# Patient Record
Sex: FEMALE | Race: WHITE | HISPANIC OR LATINO | Employment: OTHER | URBAN - METROPOLITAN AREA
[De-identification: names, ages, dates, MRNs, and addresses within clinical notes are randomized per-mention and may not be internally consistent; named-entity substitution may affect disease eponyms.]

---

## 2017-12-25 ENCOUNTER — RESOLUTE PROFESSIONAL BILLING HOSPITAL PROF FEE (OUTPATIENT)
Dept: HOSPITALIST | Facility: MEDICAL CENTER | Age: 67
End: 2017-12-25
Payer: MEDICARE

## 2017-12-25 ENCOUNTER — APPOINTMENT (OUTPATIENT)
Dept: RADIOLOGY | Facility: MEDICAL CENTER | Age: 67
DRG: 193 | End: 2017-12-25
Attending: EMERGENCY MEDICINE
Payer: MEDICARE

## 2017-12-25 ENCOUNTER — HOSPITAL ENCOUNTER (INPATIENT)
Facility: MEDICAL CENTER | Age: 67
LOS: 2 days | DRG: 193 | End: 2017-12-27
Attending: EMERGENCY MEDICINE | Admitting: INTERNAL MEDICINE
Payer: MEDICARE

## 2017-12-25 ENCOUNTER — APPOINTMENT (OUTPATIENT)
Dept: RADIOLOGY | Facility: IMAGING CENTER | Age: 67
End: 2017-12-25
Attending: FAMILY MEDICINE
Payer: MEDICARE

## 2017-12-25 ENCOUNTER — OFFICE VISIT (OUTPATIENT)
Dept: URGENT CARE | Facility: PHYSICIAN GROUP | Age: 67
End: 2017-12-25
Payer: MEDICARE

## 2017-12-25 VITALS
DIASTOLIC BLOOD PRESSURE: 58 MMHG | SYSTOLIC BLOOD PRESSURE: 94 MMHG | OXYGEN SATURATION: 94 % | HEART RATE: 87 BPM | TEMPERATURE: 101.6 F

## 2017-12-25 DIAGNOSIS — R07.9 CHEST PAIN, UNSPECIFIED TYPE: ICD-10-CM

## 2017-12-25 DIAGNOSIS — R09.02 HYPOXIA: ICD-10-CM

## 2017-12-25 DIAGNOSIS — J44.1 ACUTE EXACERBATION OF CHRONIC OBSTRUCTIVE PULMONARY DISEASE (COPD) (HCC): ICD-10-CM

## 2017-12-25 DIAGNOSIS — J10.1 INFLUENZA A: ICD-10-CM

## 2017-12-25 DIAGNOSIS — R06.02 SHORTNESS OF BREATH: ICD-10-CM

## 2017-12-25 DIAGNOSIS — J44.9 CHRONIC OBSTRUCTIVE PULMONARY DISEASE, UNSPECIFIED COPD TYPE (HCC): ICD-10-CM

## 2017-12-25 DIAGNOSIS — Z72.0 TOBACCO USE: ICD-10-CM

## 2017-12-25 PROBLEM — E86.0 DEHYDRATION: Status: ACTIVE | Noted: 2017-12-25

## 2017-12-25 PROBLEM — E03.9 HYPOTHYROIDISM: Status: ACTIVE | Noted: 2017-12-25

## 2017-12-25 PROBLEM — E87.6 HYPOKALEMIA: Status: ACTIVE | Noted: 2017-12-25

## 2017-12-25 PROBLEM — I10 ESSENTIAL HYPERTENSION: Status: ACTIVE | Noted: 2017-12-25

## 2017-12-25 PROBLEM — J11.1 INFLUENZA: Status: ACTIVE | Noted: 2017-12-25

## 2017-12-25 LAB
ALBUMIN SERPL BCP-MCNC: 3.9 G/DL (ref 3.2–4.9)
ALBUMIN/GLOB SERPL: 1.1 G/DL
ALP SERPL-CCNC: 58 U/L (ref 30–99)
ALT SERPL-CCNC: 21 U/L (ref 2–50)
ANION GAP SERPL CALC-SCNC: 10 MMOL/L (ref 0–11.9)
APPEARANCE UR: CLEAR
AST SERPL-CCNC: 27 U/L (ref 12–45)
BACTERIA #/AREA URNS HPF: ABNORMAL /HPF
BASOPHILS # BLD AUTO: 0.5 % (ref 0–1.8)
BASOPHILS # BLD: 0.03 K/UL (ref 0–0.12)
BILIRUB SERPL-MCNC: 0.8 MG/DL (ref 0.1–1.5)
BILIRUB UR QL STRIP.AUTO: NEGATIVE
BNP SERPL-MCNC: 46 PG/ML (ref 0–100)
BUN SERPL-MCNC: 25 MG/DL (ref 8–22)
CALCIUM SERPL-MCNC: 9 MG/DL (ref 8.5–10.5)
CHLORIDE SERPL-SCNC: 101 MMOL/L (ref 96–112)
CO2 SERPL-SCNC: 23 MMOL/L (ref 20–33)
COLOR UR: YELLOW
CREAT SERPL-MCNC: 1.22 MG/DL (ref 0.5–1.4)
EKG IMPRESSION: NORMAL
EOSINOPHIL # BLD AUTO: 0.02 K/UL (ref 0–0.51)
EOSINOPHIL NFR BLD: 0.3 % (ref 0–6.9)
EPI CELLS #/AREA URNS HPF: ABNORMAL /HPF
ERYTHROCYTE [DISTWIDTH] IN BLOOD BY AUTOMATED COUNT: 46.3 FL (ref 35.9–50)
FLUAV RNA SPEC QL NAA+PROBE: POSITIVE
FLUBV RNA SPEC QL NAA+PROBE: NEGATIVE
GFR SERPL CREATININE-BSD FRML MDRD: 44 ML/MIN/1.73 M 2
GLOBULIN SER CALC-MCNC: 3.4 G/DL (ref 1.9–3.5)
GLUCOSE SERPL-MCNC: 114 MG/DL (ref 65–99)
GLUCOSE UR STRIP.AUTO-MCNC: NEGATIVE MG/DL
HCT VFR BLD AUTO: 39.6 % (ref 37–47)
HGB BLD-MCNC: 12.7 G/DL (ref 12–16)
HYALINE CASTS #/AREA URNS LPF: ABNORMAL /LPF
IMM GRANULOCYTES # BLD AUTO: 0.03 K/UL (ref 0–0.11)
IMM GRANULOCYTES NFR BLD AUTO: 0.5 % (ref 0–0.9)
KETONES UR STRIP.AUTO-MCNC: ABNORMAL MG/DL
LACTATE BLD-SCNC: 1.2 MMOL/L (ref 0.5–2)
LEUKOCYTE ESTERASE UR QL STRIP.AUTO: ABNORMAL
LYMPHOCYTES # BLD AUTO: 1.36 K/UL (ref 1–4.8)
LYMPHOCYTES NFR BLD: 20.5 % (ref 22–41)
MCH RBC QN AUTO: 28.7 PG (ref 27–33)
MCHC RBC AUTO-ENTMCNC: 32.1 G/DL (ref 33.6–35)
MCV RBC AUTO: 89.6 FL (ref 81.4–97.8)
MICRO URNS: ABNORMAL
MONOCYTES # BLD AUTO: 0.73 K/UL (ref 0–0.85)
MONOCYTES NFR BLD AUTO: 11 % (ref 0–13.4)
NEUTROPHILS # BLD AUTO: 4.45 K/UL (ref 2–7.15)
NEUTROPHILS NFR BLD: 67.2 % (ref 44–72)
NITRITE UR QL STRIP.AUTO: NEGATIVE
NRBC # BLD AUTO: 0 K/UL
NRBC BLD-RTO: 0 /100 WBC
PH UR STRIP.AUTO: 5 [PH]
PLATELET # BLD AUTO: 181 K/UL (ref 164–446)
PMV BLD AUTO: 11.6 FL (ref 9–12.9)
POTASSIUM SERPL-SCNC: 3.5 MMOL/L (ref 3.6–5.5)
PROCALCITONIN SERPL-MCNC: 0.19 NG/ML
PROT SERPL-MCNC: 7.3 G/DL (ref 6–8.2)
PROT UR QL STRIP: NEGATIVE MG/DL
RBC # BLD AUTO: 4.42 M/UL (ref 4.2–5.4)
RBC # URNS HPF: ABNORMAL /HPF
RBC UR QL AUTO: ABNORMAL
SODIUM SERPL-SCNC: 134 MMOL/L (ref 135–145)
SP GR UR STRIP.AUTO: 1.01
TROPONIN I SERPL-MCNC: 0.02 NG/ML (ref 0–0.04)
UROBILINOGEN UR STRIP.AUTO-MCNC: 0.2 MG/DL
WBC # BLD AUTO: 6.6 K/UL (ref 4.8–10.8)
WBC #/AREA URNS HPF: ABNORMAL /HPF

## 2017-12-25 PROCEDURE — 700111 HCHG RX REV CODE 636 W/ 250 OVERRIDE (IP): Performed by: EMERGENCY MEDICINE

## 2017-12-25 PROCEDURE — 84484 ASSAY OF TROPONIN QUANT: CPT

## 2017-12-25 PROCEDURE — 94760 N-INVAS EAR/PLS OXIMETRY 1: CPT

## 2017-12-25 PROCEDURE — 84145 PROCALCITONIN (PCT): CPT

## 2017-12-25 PROCEDURE — 700102 HCHG RX REV CODE 250 W/ 637 OVERRIDE(OP): Performed by: STUDENT IN AN ORGANIZED HEALTH CARE EDUCATION/TRAINING PROGRAM

## 2017-12-25 PROCEDURE — 700105 HCHG RX REV CODE 258: Performed by: EMERGENCY MEDICINE

## 2017-12-25 PROCEDURE — 99223 1ST HOSP IP/OBS HIGH 75: CPT | Mod: AI,GC | Performed by: INTERNAL MEDICINE

## 2017-12-25 PROCEDURE — 87086 URINE CULTURE/COLONY COUNT: CPT

## 2017-12-25 PROCEDURE — 99285 EMERGENCY DEPT VISIT HI MDM: CPT

## 2017-12-25 PROCEDURE — 87502 INFLUENZA DNA AMP PROBE: CPT

## 2017-12-25 PROCEDURE — 36415 COLL VENOUS BLD VENIPUNCTURE: CPT

## 2017-12-25 PROCEDURE — 80053 COMPREHEN METABOLIC PANEL: CPT

## 2017-12-25 PROCEDURE — 700111 HCHG RX REV CODE 636 W/ 250 OVERRIDE (IP): Performed by: STUDENT IN AN ORGANIZED HEALTH CARE EDUCATION/TRAINING PROGRAM

## 2017-12-25 PROCEDURE — 83605 ASSAY OF LACTIC ACID: CPT

## 2017-12-25 PROCEDURE — 87040 BLOOD CULTURE FOR BACTERIA: CPT | Mod: 91

## 2017-12-25 PROCEDURE — 93005 ELECTROCARDIOGRAM TRACING: CPT | Performed by: EMERGENCY MEDICINE

## 2017-12-25 PROCEDURE — 94640 AIRWAY INHALATION TREATMENT: CPT

## 2017-12-25 PROCEDURE — 700101 HCHG RX REV CODE 250: Performed by: EMERGENCY MEDICINE

## 2017-12-25 PROCEDURE — A9270 NON-COVERED ITEM OR SERVICE: HCPCS | Performed by: STUDENT IN AN ORGANIZED HEALTH CARE EDUCATION/TRAINING PROGRAM

## 2017-12-25 PROCEDURE — 83880 ASSAY OF NATRIURETIC PEPTIDE: CPT

## 2017-12-25 PROCEDURE — 71020 DX-CHEST-2 VIEWS: CPT | Mod: TC | Performed by: FAMILY MEDICINE

## 2017-12-25 PROCEDURE — 700105 HCHG RX REV CODE 258: Performed by: STUDENT IN AN ORGANIZED HEALTH CARE EDUCATION/TRAINING PROGRAM

## 2017-12-25 PROCEDURE — 93000 ELECTROCARDIOGRAM COMPLETE: CPT | Performed by: FAMILY MEDICINE

## 2017-12-25 PROCEDURE — 85025 COMPLETE CBC W/AUTO DIFF WBC: CPT

## 2017-12-25 PROCEDURE — 81001 URINALYSIS AUTO W/SCOPE: CPT

## 2017-12-25 PROCEDURE — 99205 OFFICE O/P NEW HI 60 MIN: CPT | Performed by: FAMILY MEDICINE

## 2017-12-25 PROCEDURE — 770021 HCHG ROOM/CARE - ISO PRIVATE

## 2017-12-25 RX ORDER — VARENICLINE TARTRATE 1 MG/1
1 TABLET, FILM COATED ORAL 2 TIMES DAILY
Status: DISCONTINUED | OUTPATIENT
Start: 2017-12-25 | End: 2017-12-25

## 2017-12-25 RX ORDER — OXYBUTYNIN CHLORIDE 5 MG/1
5 TABLET ORAL 2 TIMES DAILY
Status: DISCONTINUED | OUTPATIENT
Start: 2017-12-25 | End: 2017-12-27 | Stop reason: HOSPADM

## 2017-12-25 RX ORDER — NICOTINE POLACRILEX 4 MG/1
GUM, CHEWING ORAL
COMMUNITY
End: 2017-12-25

## 2017-12-25 RX ORDER — AMOXICILLIN 250 MG
2 CAPSULE ORAL 2 TIMES DAILY
Status: DISCONTINUED | OUTPATIENT
Start: 2017-12-26 | End: 2017-12-27 | Stop reason: HOSPADM

## 2017-12-25 RX ORDER — ASPIRIN 81 MG/1
81 TABLET, CHEWABLE ORAL EVERY EVENING
Status: DISCONTINUED | OUTPATIENT
Start: 2017-12-25 | End: 2017-12-27 | Stop reason: HOSPADM

## 2017-12-25 RX ORDER — POLYETHYLENE GLYCOL 3350 17 G/17G
1 POWDER, FOR SOLUTION ORAL
Status: DISCONTINUED | OUTPATIENT
Start: 2017-12-25 | End: 2017-12-27 | Stop reason: HOSPADM

## 2017-12-25 RX ORDER — NIACIN 500 MG/1
500 TABLET, EXTENDED RELEASE ORAL NIGHTLY
COMMUNITY
End: 2017-12-25

## 2017-12-25 RX ORDER — ACETAMINOPHEN 325 MG/1
650 TABLET ORAL EVERY 6 HOURS PRN
Status: DISCONTINUED | OUTPATIENT
Start: 2017-12-25 | End: 2017-12-27 | Stop reason: HOSPADM

## 2017-12-25 RX ORDER — VARENICLINE TARTRATE 1 MG/1
1 TABLET, FILM COATED ORAL 2 TIMES DAILY
COMMUNITY

## 2017-12-25 RX ORDER — NIACIN 500 MG
500 TABLET ORAL DAILY
COMMUNITY

## 2017-12-25 RX ORDER — LOVASTATIN 20 MG/1
20 TABLET ORAL NIGHTLY
COMMUNITY

## 2017-12-25 RX ORDER — LEVOTHYROXINE SODIUM 0.1 MG/1
100 TABLET ORAL
COMMUNITY

## 2017-12-25 RX ORDER — SERTRALINE HYDROCHLORIDE 100 MG/1
100 TABLET, FILM COATED ORAL DAILY
Status: DISCONTINUED | OUTPATIENT
Start: 2017-12-25 | End: 2017-12-27 | Stop reason: HOSPADM

## 2017-12-25 RX ORDER — SODIUM CHLORIDE 9 MG/ML
INJECTION, SOLUTION INTRAVENOUS CONTINUOUS
Status: DISCONTINUED | OUTPATIENT
Start: 2017-12-25 | End: 2017-12-26

## 2017-12-25 RX ORDER — ISOSORBIDE MONONITRATE 30 MG/1
30 TABLET, EXTENDED RELEASE ORAL EVERY EVENING
Status: DISCONTINUED | OUTPATIENT
Start: 2017-12-25 | End: 2017-12-27 | Stop reason: HOSPADM

## 2017-12-25 RX ORDER — LEVOTHYROXINE SODIUM 0.1 MG/1
100 TABLET ORAL
Status: DISCONTINUED | OUTPATIENT
Start: 2017-12-26 | End: 2017-12-27 | Stop reason: HOSPADM

## 2017-12-25 RX ORDER — ASPIRIN 81 MG/1
81 TABLET, CHEWABLE ORAL EVERY EVENING
COMMUNITY

## 2017-12-25 RX ORDER — OMEPRAZOLE 40 MG/1
40 CAPSULE, DELAYED RELEASE ORAL DAILY
COMMUNITY

## 2017-12-25 RX ORDER — BISACODYL 10 MG
10 SUPPOSITORY, RECTAL RECTAL
Status: DISCONTINUED | OUTPATIENT
Start: 2017-12-25 | End: 2017-12-27 | Stop reason: HOSPADM

## 2017-12-25 RX ORDER — OMEPRAZOLE 20 MG/1
40 CAPSULE, DELAYED RELEASE ORAL DAILY
Status: DISCONTINUED | OUTPATIENT
Start: 2017-12-25 | End: 2017-12-27 | Stop reason: HOSPADM

## 2017-12-25 RX ORDER — OXYBUTYNIN CHLORIDE 5 MG/1
5 TABLET ORAL 2 TIMES DAILY
COMMUNITY

## 2017-12-25 RX ORDER — POTASSIUM CHLORIDE 20 MEQ/1
40 TABLET, EXTENDED RELEASE ORAL ONCE
Status: COMPLETED | OUTPATIENT
Start: 2017-12-25 | End: 2017-12-25

## 2017-12-25 RX ORDER — AMLODIPINE AND VALSARTAN 10; 320 MG/1; MG/1
1 TABLET ORAL DAILY
COMMUNITY

## 2017-12-25 RX ORDER — LOVASTATIN 20 MG/1
20 TABLET ORAL NIGHTLY
Status: DISCONTINUED | OUTPATIENT
Start: 2017-12-25 | End: 2017-12-27 | Stop reason: HOSPADM

## 2017-12-25 RX ORDER — NIACIN 500 MG
500 TABLET ORAL DAILY
Status: DISCONTINUED | OUTPATIENT
Start: 2017-12-25 | End: 2017-12-25

## 2017-12-25 RX ORDER — SODIUM CHLORIDE 9 MG/ML
1000 INJECTION, SOLUTION INTRAVENOUS ONCE
Status: COMPLETED | OUTPATIENT
Start: 2017-12-25 | End: 2017-12-25

## 2017-12-25 RX ORDER — NICOTINE 21 MG/24HR
14 PATCH, TRANSDERMAL 24 HOURS TRANSDERMAL
Status: DISCONTINUED | OUTPATIENT
Start: 2017-12-25 | End: 2017-12-27 | Stop reason: HOSPADM

## 2017-12-25 RX ORDER — SERTRALINE HYDROCHLORIDE 100 MG/1
100 TABLET, FILM COATED ORAL DAILY
COMMUNITY

## 2017-12-25 RX ORDER — OSELTAMIVIR PHOSPHATE 75 MG/1
75 CAPSULE ORAL EVERY 12 HOURS
Status: DISCONTINUED | OUTPATIENT
Start: 2017-12-25 | End: 2017-12-25

## 2017-12-25 RX ORDER — ISOSORBIDE MONONITRATE 30 MG/1
30 TABLET, EXTENDED RELEASE ORAL EVERY EVENING
COMMUNITY

## 2017-12-25 RX ORDER — SODIUM CHLORIDE 9 MG/ML
500 INJECTION, SOLUTION INTRAVENOUS ONCE
Status: COMPLETED | OUTPATIENT
Start: 2017-12-25 | End: 2017-12-25

## 2017-12-25 RX ORDER — PREDNISONE 20 MG/1
60 TABLET ORAL ONCE
Status: COMPLETED | OUTPATIENT
Start: 2017-12-25 | End: 2017-12-25

## 2017-12-25 RX ORDER — CHOLECALCIFEROL (VITAMIN D3) 125 MCG
2000 CAPSULE ORAL 2 TIMES DAILY
COMMUNITY

## 2017-12-25 RX ORDER — OSELTAMIVIR PHOSPHATE 30 MG/1
30 CAPSULE ORAL EVERY 12 HOURS
Status: DISCONTINUED | OUTPATIENT
Start: 2017-12-25 | End: 2017-12-26

## 2017-12-25 RX ADMIN — OXYBUTYNIN CHLORIDE 5 MG: 5 TABLET ORAL at 21:22

## 2017-12-25 RX ADMIN — ALBUTEROL SULFATE 2.5 MG: 2.5 SOLUTION RESPIRATORY (INHALATION) at 11:58

## 2017-12-25 RX ADMIN — SODIUM CHLORIDE 1000 ML: 9 INJECTION, SOLUTION INTRAVENOUS at 11:59

## 2017-12-25 RX ADMIN — OMEPRAZOLE 40 MG: 20 CAPSULE, DELAYED RELEASE ORAL at 15:21

## 2017-12-25 RX ADMIN — OXYBUTYNIN CHLORIDE 5 MG: 5 TABLET ORAL at 15:21

## 2017-12-25 RX ADMIN — LOVASTATIN 20 MG: 20 TABLET ORAL at 21:22

## 2017-12-25 RX ADMIN — ASPIRIN 81 MG: 81 TABLET, CHEWABLE ORAL at 21:22

## 2017-12-25 RX ADMIN — POTASSIUM CHLORIDE 40 MEQ: 1500 TABLET, EXTENDED RELEASE ORAL at 17:31

## 2017-12-25 RX ADMIN — SODIUM CHLORIDE: 9 INJECTION, SOLUTION INTRAVENOUS at 15:23

## 2017-12-25 RX ADMIN — SALMETEROL XINAFOATE 1 PUFF: 50 POWDER, METERED ORAL; RESPIRATORY (INHALATION) at 21:23

## 2017-12-25 RX ADMIN — ISOSORBIDE MONONITRATE 30 MG: 30 TABLET ORAL at 21:23

## 2017-12-25 RX ADMIN — OSELTAMIVIR PHOSPHATE 30 MG: 30 CAPSULE ORAL at 21:22

## 2017-12-25 RX ADMIN — SODIUM CHLORIDE 500 ML: 9 INJECTION, SOLUTION INTRAVENOUS at 13:51

## 2017-12-25 RX ADMIN — PREDNISONE 60 MG: 20 TABLET ORAL at 11:59

## 2017-12-25 RX ADMIN — SERTRALINE 100 MG: 100 TABLET, FILM COATED ORAL at 15:21

## 2017-12-25 RX ADMIN — ENOXAPARIN SODIUM 40 MG: 100 INJECTION SUBCUTANEOUS at 17:48

## 2017-12-25 ASSESSMENT — ENCOUNTER SYMPTOMS
DIARRHEA: 0
VOMITING: 0
WHEEZING: 0
SORE THROAT: 0
VOMITING: 0
PALPITATIONS: 0
WEAKNESS: 1
CHILLS: 0
EYE REDNESS: 0
FEVER: 0
COUGH: 0
PND: 0
SHORTNESS OF BREATH: 0
COUGH: 1
FEVER: 0
SORE THROAT: 0
SENSORY CHANGE: 0
SINUS PAIN: 0
FOCAL WEAKNESS: 0
SPUTUM PRODUCTION: 0
SPEECH CHANGE: 0
SPUTUM PRODUCTION: 0
BLURRED VISION: 0
WHEEZING: 0
SHORTNESS OF BREATH: 1
DIAPHORESIS: 1
HEADACHES: 0
PALPITATIONS: 0
FOCAL WEAKNESS: 0
HEADACHES: 0
DIARRHEA: 0
MYALGIAS: 1
NAUSEA: 1
DEPRESSION: 0
EYE DISCHARGE: 0
DIZZINESS: 0
DOUBLE VISION: 0
CHILLS: 0
NAUSEA: 1
ABDOMINAL PAIN: 0
PHOTOPHOBIA: 0
MYALGIAS: 0
ABDOMINAL PAIN: 0
DIZZINESS: 1

## 2017-12-25 ASSESSMENT — LIFESTYLE VARIABLES
HAVE YOU EVER FELT YOU SHOULD CUT DOWN ON YOUR DRINKING: NO
DO YOU DRINK ALCOHOL: YES
EVER HAD A DRINK FIRST THING IN THE MORNING TO STEADY YOUR NERVES TO GET RID OF A HANGOVER: NO
TOTAL SCORE: 0
EVER HAD A DRINK FIRST THING IN THE MORNING TO STEADY YOUR NERVES TO GET RID OF A HANGOVER: NO
ALCOHOL_USE: YES
TOTAL SCORE: 0
HAVE PEOPLE ANNOYED YOU BY CRITICIZING YOUR DRINKING: NO
EVER FELT BAD OR GUILTY ABOUT YOUR DRINKING: NO
CONSUMPTION TOTAL: INCOMPLETE
HAVE PEOPLE ANNOYED YOU BY CRITICIZING YOUR DRINKING: NO
HOW MANY TIMES IN THE PAST YEAR HAVE YOU HAD 5 OR MORE DRINKS IN A DAY: 0
EVER_SMOKED: YES
AVERAGE NUMBER OF DAYS PER WEEK YOU HAVE A DRINK CONTAINING ALCOHOL: 1
HAVE YOU EVER FELT YOU SHOULD CUT DOWN ON YOUR DRINKING: NO
CONSUMPTION TOTAL: NEGATIVE
ON A TYPICAL DAY WHEN YOU DRINK ALCOHOL HOW MANY DRINKS DO YOU HAVE: 1
TOTAL SCORE: 0
EVER FELT BAD OR GUILTY ABOUT YOUR DRINKING: NO

## 2017-12-25 ASSESSMENT — COPD QUESTIONNAIRES
DO YOU EVER COUGH UP ANY MUCUS OR PHLEGM?: YES, A FEW DAYS A WEEK OR MONTH
COPD SCREENING SCORE: 7
DURING THE PAST 4 WEEKS HOW MUCH DID YOU FEEL SHORT OF BREATH: SOME OF THE TIME
HAVE YOU SMOKED AT LEAST 100 CIGARETTES IN YOUR ENTIRE LIFE: YES

## 2017-12-25 ASSESSMENT — PAIN SCALES - GENERAL
PAINLEVEL_OUTOF10: 7
PAINLEVEL_OUTOF10: 0
PAINLEVEL_OUTOF10: 0

## 2017-12-25 ASSESSMENT — PATIENT HEALTH QUESTIONNAIRE - PHQ9
1. LITTLE INTEREST OR PLEASURE IN DOING THINGS: NOT AT ALL
SUM OF ALL RESPONSES TO PHQ QUESTIONS 1-9: 0
SUM OF ALL RESPONSES TO PHQ9 QUESTIONS 1 AND 2: 0
2. FEELING DOWN, DEPRESSED, IRRITABLE, OR HOPELESS: NOT AT ALL

## 2017-12-25 NOTE — ASSESSMENT & PLAN NOTE
- Patient told she had COPD, doesn't remember doing PFTs  - Smokes 0.5-1 ppd for 50 years  - Recently quit smoking 1 week ago  - RT protocol, continuous pulse ox  - Nicotine replacement  - Patient failed ambulatory O2 assessment, desaturated to 78% during ambulation, will require home oxygen  - Currently working with SW to provide patient with home oxygen

## 2017-12-25 NOTE — PROGRESS NOTES
"Subjective:      Janell Oconnor is a 67 y.o. female who presents with Chest Pain (Crushing chest pain, dizziness, weakness x 4 days )            Subjective:    Janell Oconnor is a 67 y.o. female who presents for evaluation of chest pain. Onset was 4 days ago, with worsening course since that time. The patient describes the pain as constant, occurring with increasing frequency, \"crushing\" in nature located in mid sternum and does not radiate . Patient rates pain as a 7/10 in intensity.  Associated symptoms: chest pain, chest pressure/discomfort, fatigue and nausea. On presentation her oxygen saturation was 85 %. Aggravating factors: coughing.  Alleviating factors are: nothing. Patient's cardiac risk factors are smoking/ tobacco exposure, family history, hypertension and hx of angina. Previous cardiac testing: none on file to [facility].      No past medical history on file.  There are no active problems to display for this patient.    No past surgical history on file.  No family history on file.    Social History    Marital status: Single              Spouse name:                       Years of education:                 Number of children:               Social History Main Topics    Drug use: Unknown    No current outpatient prescriptions on file.  No current facility-administered medications for this visit.     No Known Allergies            Review of Systems   Constitutional: Positive for diaphoresis and malaise/fatigue. Negative for chills and fever.   HENT: Negative for congestion, ear pain, sinus pain and sore throat.    Eyes: Negative for discharge and redness.   Respiratory: Positive for shortness of breath. Negative for cough, sputum production and wheezing.    Cardiovascular: Positive for chest pain. Negative for palpitations.   Gastrointestinal: Positive for nausea. Negative for abdominal pain, diarrhea and vomiting.   Genitourinary: Negative for dysuria and urgency.   Musculoskeletal: Negative for myalgias. "   Skin: Negative for rash.   Neurological: Positive for dizziness. Negative for sensory change, speech change, focal weakness and headaches.          Objective:     BP (!) 94/58   Pulse 87   Temp (!) 38.7 °C (101.6 °F)   SpO2 (!) 84%      Physical Exam   Constitutional: She is oriented to person, place, and time. She appears well-developed. She appears distressed.   HENT:   Head: Normocephalic.   Right Ear: External ear normal.   Left Ear: External ear normal.   Eyes: EOM are normal. Pupils are equal, round, and reactive to light. Right eye exhibits no discharge. Left eye exhibits no discharge.   Neck: Normal range of motion. Neck supple.   Cardiovascular: Normal rate, regular rhythm and normal heart sounds.    Reproducible chest pain in sternum.    Pulmonary/Chest: Effort normal and breath sounds normal. She has no wheezes.   Abdominal: Soft. Bowel sounds are normal. She exhibits no distension. There is no tenderness.   Lymphadenopathy:     She has no cervical adenopathy.   Neurological: She is alert and oriented to person, place, and time. No cranial nerve deficit. Coordination normal.   Skin: Skin is warm and dry.               Assessment/Plan:     ECG: rhythm: normal sinus rhythm, rate=82 bpm, ql=524 ms, ubr=781 ms, ECG WNL., poor R-wave progression or low voltage           Assessment:    Chest pain, suspected etiology: acute coronary syndrome     Plan:  -Patient history and exam consistent with non-cardiac cause of chest pain.  -Due to hypoxia she was placed on supplemental oxygen 2 L/min and the saturation improved in mid 90's. She was given chewable  mg.   - CXR : on independent read no consolidation and sign of PNA. official read pending  Worsening signs and symptoms discussed and patient verbalized understanding.  History and exam consistent with cardiac chest pain.  Oxygen started.  To Emergency Department via ambulance. Transfer to [facility] for ACS r/o and higher level care. Report was called  in to Dr. Hunt.

## 2017-12-25 NOTE — ED PROVIDER NOTES
ED Provider Note    CHIEF COMPLAINT  Chief Complaint   Patient presents with   • Chest Pain   • Shortness of Breath   • Cough     x3days       HPI  Charlene Oconnor is a 67 y.o. Female Smoker who quit last week with h/o HTN and COPD who presents complaining of chest pain.    Patient sent here from urgent care today for hypoxia of 88%.   Patient reports a deep cough but unable to bring up sputum for 4 days. She has been dyspneic on exertion with this illness. She quit smoking last week. She is not on home O2. Her home inhalers have not been helping. She has had substernal chest pressure with coughing but denies exertional chest pain. Occasionally she has edema in the left leg but reports none today. Patient denies hemoptysis, fever, chills, vomiting, diarrhea. She traveled here on a plane and is concerned she may been exposed to something during the flight. Multiple family members have recently been sick here.    ASA PTA    ALLERGIES  No Known Allergies    CURRENT MEDICATIONS  Home Medications     Reviewed by Carlene Richardson (Pharmacy Tech) on 12/25/17 at 1248  Med List Status: Complete   Medication Last Dose Status   amlodipine-valsartan (EXFORGE)  MG per tablet 12/21/2017 Active   aspirin (ASA) 81 MG Chew Tab chewable tablet 12/21/2017 Active   AZELASTINE HCL NA 12/21/2017 Active   Cholecalciferol (VITAMIN D3) 2000 units Tab 12/14/2017 Active   Glycopyrrolate-Formoterol (BEVESPI AEROSPHERE) 9-4.8 MCG/ACT Aerosol 12/24/2017 Active   isosorbide mononitrate SR (IMDUR) 30 MG TABLET SR 24 HR 12/24/2017 Active   levothyroxine (SYNTHROID) 100 MCG Tab 12/21/2017 Active   lovastatin (MEVACOR) 20 MG Tab 12/21/2017 Active   niacin 500 MG Tab 12/21/2017 Active   omeprazole (PRILOSEC) 40 MG delayed-release capsule 12/21/2017 Active   oxybutynin (DITROPAN) 5 MG Tab 12/21/2017 Active   sertraline (ZOLOFT) 100 MG Tab 12/21/2017 Active   varenicline (CHANTIX) 1 MG tablet 12/21/2017 Active                PAST MEDICAL  "HISTORY   has a past medical history of HTN (hypertension) and Hypothyroid.    SURGICAL HISTORY  patient denies any surgical history    SOCIAL HISTORY  Patient lives in [place]  Patient quit smoking last week  Patient here with daughter and granddaughter    Family Hx:  No family h/o PE/DVT      REVIEW OF SYSTEMS  See HPI for further details.  All other systems are negative except as above in HPI.      PHYSICAL EXAM  VITAL SIGNS: BP (!) 96/70   Pulse 79   Temp 36.3 °C (97.4 °F)   Resp 14   Ht 1.6 m (5' 3\")   Wt 68 kg (150 lb)   SpO2 94%   BMI 26.57 kg/m²     General:  WDWN, nontoxic appearing in NAD; A+Ox3; V/S as above; afebrile, 94% on room air, systolic blood pressure in the 90s  Skin: warm and dry; good color; no rash  HEENT: NCAT; EOMs intact; PERRL; no scleral icterus   Neck: FROM; no meningismus, no LAD; no JVD  Cardiovascular: Regular heart rate and rhythm.  No murmurs, rubs, or gallops; pulses 2+ bilaterally radially and DP areas  Lungs: Clear to auscultation with good air movement bilaterally.  No wheezes, rhonchi, or rales.   Abdomen: BS present; soft; NTND; no rebound, guarding, or rigidity.  No organomegaly or pulsatile mass; no CVAT   Extremities: ROMO x 4; no e/o trauma; no pedal edema; neg Raeann's  Neurologic: CNs III-XII grossly intact; speech clear; distal sensation intact; strength 5/5 UE/LEs  Psychiatric: Appropriate affect, normal mood    LABS  Results for orders placed or performed during the hospital encounter of 12/25/17   Lactic acid (lactate)   Result Value Ref Range    Lactic Acid 1.2 0.5 - 2.0 mmol/L   CBC WITH DIFFERENTIAL   Result Value Ref Range    WBC 6.6 4.8 - 10.8 K/uL    RBC 4.42 4.20 - 5.40 M/uL    Hemoglobin 12.7 12.0 - 16.0 g/dL    Hematocrit 39.6 37.0 - 47.0 %    MCV 89.6 81.4 - 97.8 fL    MCH 28.7 27.0 - 33.0 pg    MCHC 32.1 (L) 33.6 - 35.0 g/dL    RDW 46.3 35.9 - 50.0 fL    Platelet Count 181 164 - 446 K/uL    MPV 11.6 9.0 - 12.9 fL    Neutrophils-Polys 67.20 44.00 - " 72.00 %    Lymphocytes 20.50 (L) 22.00 - 41.00 %    Monocytes 11.00 0.00 - 13.40 %    Eosinophils 0.30 0.00 - 6.90 %    Basophils 0.50 0.00 - 1.80 %    Immature Granulocytes 0.50 0.00 - 0.90 %    Nucleated RBC 0.00 /100 WBC    Neutrophils (Absolute) 4.45 2.00 - 7.15 K/uL    Lymphs (Absolute) 1.36 1.00 - 4.80 K/uL    Monos (Absolute) 0.73 0.00 - 0.85 K/uL    Eos (Absolute) 0.02 0.00 - 0.51 K/uL    Baso (Absolute) 0.03 0.00 - 0.12 K/uL    Immature Granulocytes (abs) 0.03 0.00 - 0.11 K/uL    NRBC (Absolute) 0.00 K/uL   COMP METABOLIC PANEL   Result Value Ref Range    Sodium 134 (L) 135 - 145 mmol/L    Potassium 3.5 (L) 3.6 - 5.5 mmol/L    Chloride 101 96 - 112 mmol/L    Co2 23 20 - 33 mmol/L    Anion Gap 10.0 0.0 - 11.9    Glucose 114 (H) 65 - 99 mg/dL    Bun 25 (H) 8 - 22 mg/dL    Creatinine 1.22 0.50 - 1.40 mg/dL    Calcium 9.0 8.5 - 10.5 mg/dL    AST(SGOT) 27 12 - 45 U/L    ALT(SGPT) 21 2 - 50 U/L    Alkaline Phosphatase 58 30 - 99 U/L    Total Bilirubin 0.8 0.1 - 1.5 mg/dL    Albumin 3.9 3.2 - 4.9 g/dL    Total Protein 7.3 6.0 - 8.2 g/dL    Globulin 3.4 1.9 - 3.5 g/dL    A-G Ratio 1.1 g/dL   TROPONIN   Result Value Ref Range    Troponin I 0.02 0.00 - 0.04 ng/mL   BNP   Result Value Ref Range    B Natriuretic Peptide 46 0 - 100 pg/mL   ESTIMATED GFR   Result Value Ref Range    GFR If  53 (A) >60 mL/min/1.73 m 2    GFR If Non  44 (A) >60 mL/min/1.73 m 2   INFLUENZA A/B BY PCR   Result Value Ref Range    Influenza virus A RNA POSITIVE (A) Negative    Influenza virus B, PCR Negative Negative   EKG (ER)   Result Value Ref Range    Report       [facility] Emergency Dept.    Test Date:  2017  Pt Name:    JOSUE GARCIA               Department: ER  MRN:        [id number]                      Room:       [facility]  Gender:     Female                       Technician: [id number]  :        [id number]                   Requested By:ER TRIAGE PROTOCOL  Order #:    [id number]                     Reading MD:    Measurements  Intervals                                Axis  Rate:       71                           P:          67  NH:         212                          QRS:        23  QRSD:       84                           T:          52  QT:         424  QTc:        461    Interpretive Statements  SINUS RHYTHM  BORDERLINE AV CONDUCTION DELAY  EARLY PRECORDIAL R/S TRANSITION  No previous ECG available for comparison           EKG  12 Lead EKG obtained at 1101 and interpreted by me to show:  Rhythm: Sinus rhythm   Rate: 71  Intervals:   NH: 212 and prolonged   QRS: 84   QTC: 461   No ectopy  Normal axis  No ST changes  Clinical Impression: sinus rhythm with no acute ST changes  No prior available  EKG has been confirmed in [facility]       IMAGING  See outpatient study from today    MEDICAL RECORD  I have reviewed patient's medical record and pertinent results are listed below.      COURSE & MEDICAL DECISION MAKING  I have reviewed any medical record information, laboratory studies and radiographic results as noted.    Charlene Oconnor is a 67 y.o. female who presents complaining of cough, chest pain, shortness of breath.    EKG demonstrated no acute ST changes    NS given for hypotension, SBP in 90s  Albuterol nebulizer ordered along with prednisone    ASA given PTA    Lactic acid and white blood cell count are normal.  He should appears slightly dehydrated on her chemistry panel with a sodium of 134, potassium 3.5, glucose 114, and creatinine of 1.2. Patient is afebrile and nontoxic appearing but desats on room air.    Chest x-ray at outpatient facility demonstrated a granuloma in the right lung but no evidence of focal consolidation, pulmonary edema, effusion, pneumothorax, or cardiomegaly.    1:12 PM  Patient is flu positive  Patient and family were informed. Patient's blood pressure is systolic 86. Her breathing is much improved following a nebulizer treatment. At this time, I feel her hypoxia  can be explained by COPD exacerbation with flu. I do not feel the need to pursue a PE rule out. Paging [facility] internal medicine.    1:40 PM  I discussed the case with the [facility] internal medicine resident who agrees to admit the patient.      FINAL IMPRESSION  1. Shortness of breath    2. Hypoxia    3. Influenza A    4. Acute exacerbation of chronic obstructive pulmonary disease (COPD) (CMS-[facility])        Electronically signed by: Heather Hunt, 12/25/2017 11:10 AM

## 2017-12-25 NOTE — ED NOTES
The Medication Reconciliation process has been completed by interviewing the patient who is visiting from [place]    Allergies have been reviewed  Antibiotic use in 30 days - none    Home Pharmacy:  [facility]

## 2017-12-25 NOTE — H&P
Internal Medicine Admitting History and Physical    Note Author: Alivia Lin M.D.       Name Charlene Oconnor 1950   Age/Sex 67 y.o. female   MRN [id number]   Code Status Full Code     After 5PM or if no immediate response to page, please call for cross-coverage  Attending/Team: Dr. Stiles/Aureliano See Patient List for primary contact information  Call [phone] to page    1st Call - Day Intern (R1):   Dr. Lin 2nd Call - Day Sr. Resident (R2/R3):   Dr. Fontanez     Chief Complaint:  Cough, lightheadedness, mylagia    HPI:  Ms. Oconnor is a 67-year-old female with a past medical history of COPD, hypertension, hypothyroidism, dyslipidemia who presents today from the urgent care for cough and shortness of breath. Patient state that the symptoms started 4 days ago and have progressively increased in severity. She states that over this time she has experienced generalized fatigue, lightheadedness, myalgias, headache, dry cough and chest pain. She describes the chest pain as a tightness across her chest with no radiation and no relation to exertion. She notes poor by mouth intake over this time as she has had generalized malaise. She also complains of nausea but no vomiting or diarrhea. She states that earlier today she went to the urgent care for these symptoms and was noted to become hypoxic to 87% on room air. She was then advised to come to the ED for further evaluation. She states she got a flu shot this year. She denies any sick contacts.      In the ED, Patient was afebrile, heart rate 75, respiratory rate 16, blood pressure 96/70. Labs show WBC count of 6.6 with no left shift, hemoglobin of 12.7, sodium 134, potassium 3.5, BUN 25, creatinine 1.2, normal liver enzymes. Troponin and BNP were negative. Influenza A was positive. EKG showed NSR with no signs of ischemia. Chest x-ray showed no acute process. Patient was given IV fluids and subsequently admitted for further evaluation. Patient denied  any chest pain at the time of evaluation.      Review of Systems   Constitutional: Negative for chills and fever.   HENT: Negative for congestion, hearing loss and sore throat.    Eyes: Negative for blurred vision, double vision and photophobia.   Respiratory: Positive for cough. Negative for sputum production, shortness of breath and wheezing.    Cardiovascular: Negative for chest pain, palpitations, leg swelling and PND.   Gastrointestinal: Positive for nausea. Negative for abdominal pain, diarrhea and vomiting.   Genitourinary: Negative for dysuria, frequency and urgency.   Musculoskeletal: Positive for myalgias.   Skin: Negative for rash.   Neurological: Positive for weakness. Negative for dizziness, focal weakness and headaches.   Psychiatric/Behavioral: Negative for depression.           Past Medical History:   Past Medical History:   Diagnosis Date   • HTN (hypertension)    • Hypothyroid        Past Surgical History:  History reviewed. No pertinent surgical history.    Current Outpatient Medications:  Home Medications     Reviewed by Carlene Richardson (Pharmacy Tech) on 12/25/17 at 1248  Med List Status: Complete   Medication Last Dose Status   amlodipine-valsartan (EXFORGE)  MG per tablet 12/21/2017 Active   aspirin (ASA) 81 MG Chew Tab chewable tablet 12/21/2017 Active   AZELASTINE HCL NA 12/21/2017 Active   Cholecalciferol (VITAMIN D3) 2000 units Tab 12/14/2017 Active   Glycopyrrolate-Formoterol (BEVESPI AEROSPHERE) 9-4.8 MCG/ACT Aerosol 12/24/2017 Active   isosorbide mononitrate SR (IMDUR) 30 MG TABLET SR 24 HR 12/24/2017 Active   levothyroxine (SYNTHROID) 100 MCG Tab 12/21/2017 Active   lovastatin (MEVACOR) 20 MG Tab 12/21/2017 Active   niacin 500 MG Tab 12/21/2017 Active   omeprazole (PRILOSEC) 40 MG delayed-release capsule 12/21/2017 Active   oxybutynin (DITROPAN) 5 MG Tab 12/21/2017 Active   sertraline (ZOLOFT) 100 MG Tab 12/21/2017 Active   varenicline (CHANTIX) 1 MG tablet 12/21/2017  "Active                Medication Allergy/Sensitivities:  No Known Allergies      Family History:  History reviewed. No pertinent family history.    Social History:  Social History     Social History   • Marital status: Single     Spouse name: N/A   • Number of children: N/A   • Years of education: N/A     Occupational History   • Not on file.     Social History Main Topics   • Smoking status: Former Smoker   • Smokeless tobacco: Former User   • Alcohol use Yes      Comment: occ   • Drug use: Unknown   • Sexual activity: Not on file     Other Topics Concern   • Not on file     Social History Narrative   • No narrative on file       Physical Exam     Vitals:    12/25/17 1352 12/25/17 1400 12/25/17 1430 12/25/17 1505   BP:    119/62   Pulse: 76 74 73 75   Resp: 16 16 (!) 21 20   Temp:    36.5 °C (97.7 °F)   SpO2: 94% 94% 97% 95%   Weight:       Height:         Body mass index is 26.57 kg/m².  /62   Pulse 75   Temp 36.5 °C (97.7 °F)   Resp 20   Ht 1.6 m (5' 3\")   Wt 68 kg (150 lb)   SpO2 95%   BMI 26.57 kg/m²   O2 therapy: Pulse Oximetry: 95 %, O2 (LPM): 1.5      Physical Exam   Constitutional: She is oriented to person, place, and time and well-developed, well-nourished, and in no distress.   HENT:   Head: Normocephalic and atraumatic.   Mouth/Throat: Mucous membranes are dry. No oropharyngeal exudate.   Eyes: Conjunctivae are normal. Pupils are equal, round, and reactive to light. No scleral icterus.   Neck: Normal range of motion. Neck supple. No JVD present.   Cardiovascular: Normal rate, regular rhythm and normal heart sounds.    Pulmonary/Chest: Effort normal and breath sounds normal. No respiratory distress. She has no wheezes.   Abdominal: Soft. Bowel sounds are normal. She exhibits no distension. There is no tenderness. There is no rebound.   Musculoskeletal: Normal range of motion. She exhibits no edema.   Neurological: She is alert and oriented to person, place, and time. No cranial nerve " deficit.   Skin: Skin is dry. No rash noted. No erythema.   Psychiatric: Affect normal.       Data Review       Old Records Request:   Completed  Current Records review and summary: Completed    Lab Data Review:  Recent Results (from the past 24 hour(s))   EKG (ER)    Collection Time: 17 11:01 AM   Result Value Ref Range    Report       [facility] Emergency Dept[facility]    Test Date:  2017  Pt Name:    JOSUE GARCIA               Department: ER  MRN:        [id number]                      Room:       [facility]  Gender:     Female                       Technician: [id number]  :        1950                   Requested By:ER TRIAGE PROTOCOL  Order #:    [id number]                    Reading MD:    Measurements  Intervals                                Axis  Rate:       71                           P:          67  IN:         212                          QRS:        23  QRSD:       84                           T:          52  QT:         424  QTc:        461    Interpretive Statements  SINUS RHYTHM  BORDERLINE AV CONDUCTION DELAY  EARLY PRECORDIAL R/S TRANSITION  No previous ECG available for comparison     Lactic acid (lactate)    Collection Time: 17 11:03 AM   Result Value Ref Range    Lactic Acid 1.2 0.5 - 2.0 mmol/L   CBC WITH DIFFERENTIAL    Collection Time: 17 11:03 AM   Result Value Ref Range    WBC 6.6 4.8 - 10.8 K/uL    RBC 4.42 4.20 - 5.40 M/uL    Hemoglobin 12.7 12.0 - 16.0 g/dL    Hematocrit 39.6 37.0 - 47.0 %    MCV 89.6 81.4 - 97.8 fL    MCH 28.7 27.0 - 33.0 pg    MCHC 32.1 (L) 33.6 - 35.0 g/dL    RDW 46.3 35.9 - 50.0 fL    Platelet Count 181 164 - 446 K/uL    MPV 11.6 9.0 - 12.9 fL    Neutrophils-Polys 67.20 44.00 - 72.00 %    Lymphocytes 20.50 (L) 22.00 - 41.00 %    Monocytes 11.00 0.00 - 13.40 %    Eosinophils 0.30 0.00 - 6.90 %    Basophils 0.50 0.00 - 1.80 %    Immature Granulocytes 0.50 0.00 - 0.90 %    Nucleated RBC 0.00 /100 WBC    Neutrophils (Absolute) 4.45 2.00  - 7.15 K/uL    Lymphs (Absolute) 1.36 1.00 - 4.80 K/uL    Monos (Absolute) 0.73 0.00 - 0.85 K/uL    Eos (Absolute) 0.02 0.00 - 0.51 K/uL    Baso (Absolute) 0.03 0.00 - 0.12 K/uL    Immature Granulocytes (abs) 0.03 0.00 - 0.11 K/uL    NRBC (Absolute) 0.00 K/uL   COMP METABOLIC PANEL    Collection Time: 12/25/17 11:03 AM   Result Value Ref Range    Sodium 134 (L) 135 - 145 mmol/L    Potassium 3.5 (L) 3.6 - 5.5 mmol/L    Chloride 101 96 - 112 mmol/L    Co2 23 20 - 33 mmol/L    Anion Gap 10.0 0.0 - 11.9    Glucose 114 (H) 65 - 99 mg/dL    Bun 25 (H) 8 - 22 mg/dL    Creatinine 1.22 0.50 - 1.40 mg/dL    Calcium 9.0 8.5 - 10.5 mg/dL    AST(SGOT) 27 12 - 45 U/L    ALT(SGPT) 21 2 - 50 U/L    Alkaline Phosphatase 58 30 - 99 U/L    Total Bilirubin 0.8 0.1 - 1.5 mg/dL    Albumin 3.9 3.2 - 4.9 g/dL    Total Protein 7.3 6.0 - 8.2 g/dL    Globulin 3.4 1.9 - 3.5 g/dL    A-G Ratio 1.1 g/dL   TROPONIN    Collection Time: 12/25/17 11:03 AM   Result Value Ref Range    Troponin I 0.02 0.00 - 0.04 ng/mL   BNP    Collection Time: 12/25/17 11:03 AM   Result Value Ref Range    B Natriuretic Peptide 46 0 - 100 pg/mL   ESTIMATED GFR    Collection Time: 12/25/17 11:03 AM   Result Value Ref Range    GFR If  53 (A) >60 mL/min/1.73 m 2    GFR If Non  44 (A) >60 mL/min/1.73 m 2   INFLUENZA A/B BY PCR    Collection Time: 12/25/17 12:13 PM   Result Value Ref Range    Influenza virus A RNA POSITIVE (A) Negative    Influenza virus B, PCR Negative Negative   URINALYSIS    Collection Time: 12/25/17  5:30 PM   Result Value Ref Range    Color Yellow     Character Clear     Specific Gravity 1.014 <1.035    Ph 5.0 5.0 - 8.0    Glucose Negative Negative mg/dL    Ketones Trace (A) Negative mg/dL    Protein Negative Negative mg/dL    Bilirubin Negative Negative    Urobilinogen, Urine 0.2 Negative    Nitrite Negative Negative    Leukocyte Esterase Moderate (A) Negative    Occult Blood Moderate (A) Negative    Micro Urine Req  Microscopic    URINE MICROSCOPIC (W/UA)    Collection Time: 12/25/17  5:30 PM   Result Value Ref Range    WBC 10-20 (A) /hpf    RBC 2-5 (A) /hpf    Bacteria Moderate (A) None /hpf    Epithelial Cells Moderate (A) /hpf    Hyaline Cast 3-5 (A) /lpf     Imaging/Procedures Review:    Independant Imaging Review: Completed     No orders to display        EKG:   EKG Independant Review: Completed  QTc:461, HR: 71, Normal Sinus Rhythm, no ST/T changes          Assessment/Plan     * Influenza- (present on admission)   Assessment & Plan    - Patient complaining of 4 day history of worsening chest pain, fatigue, myalgias, dry cough  - Poor PO intake over this period  - 0/4 SIRS, 1/3 qSOFA, normal LA on admit  - CXR shows no acute process  - Influenza A positive  - IVF  - Continue Tamiflu  - Continue to manage patient with supportive care        COPD (chronic obstructive pulmonary disease) (CMS-[facility])   Assessment & Plan    - Patient told she had COPD, doesn't remember doing PFTs  - Smokes 0.5-1 ppd for 50 years  - Recently quit smoking 1 week ago  - RT protocol, continuous pulse ox  - Nicotine replacement        Hypokalemia   Assessment & Plan    - Patient hypokalemic on admit, 3.4  - We'll continue to monitor and replace        Hypothyroidism- (present on admission)   Assessment & Plan    - Continue home medication        Essential hypertension- (present on admission)   Assessment & Plan    - Patient has a history of hypertension  - Hold home medications            Anticipated Hospital stay:  >2 midnights      Quality Measures    Reviewed items::  EKG reviewed, Radiology images reviewed, Labs reviewed and Medications reviewed  Lawton catheter::  No [place]  : no central line.  DVT prophylaxis pharmacological::  Enoxaparin (Lovenox)  DVT prophylaxis - mechanical:  SCDs  Ulcer Prophylaxis::  No

## 2017-12-25 NOTE — ED NOTES
"Chief Complaint   Patient presents with   • Chest Pain   • Shortness of Breath   • Cough     x3days     Pt bib ems from Urgent care . Pt went there with above complaints.   Pt was given Aspirin 324mg pta.   spo2 on room air 88%, placed 2L nc, now 94%    Blood pressure (!) 96/70, pulse 75, temperature 36.3 °C (97.4 °F), resp. rate 16, height 1.6 m (5' 3\"), weight 68 kg (150 lb), SpO2 94 %.      "

## 2017-12-25 NOTE — ASSESSMENT & PLAN NOTE
- Patient complaining of 4 day history of worsening chest pain, fatigue, myalgias, dry cough  - Poor PO intake over this period  - 0/4 SIRS, 1/3 qSOFA, normal LA on admit  - CXR shows no acute process  - Influenza A positive  - Continue Tamiflu  - Continue to manage patient with supportive care  - Resolved

## 2017-12-26 LAB
ANION GAP SERPL CALC-SCNC: 7 MMOL/L (ref 0–11.9)
BASOPHILS # BLD AUTO: 0.2 % (ref 0–1.8)
BASOPHILS # BLD: 0.01 K/UL (ref 0–0.12)
BUN SERPL-MCNC: 25 MG/DL (ref 8–22)
CALCIUM SERPL-MCNC: 8.5 MG/DL (ref 8.5–10.5)
CHLORIDE SERPL-SCNC: 111 MMOL/L (ref 96–112)
CO2 SERPL-SCNC: 21 MMOL/L (ref 20–33)
CREAT SERPL-MCNC: 0.75 MG/DL (ref 0.5–1.4)
EOSINOPHIL # BLD AUTO: 0 K/UL (ref 0–0.51)
EOSINOPHIL NFR BLD: 0 % (ref 0–6.9)
ERYTHROCYTE [DISTWIDTH] IN BLOOD BY AUTOMATED COUNT: 46.5 FL (ref 35.9–50)
GFR SERPL CREATININE-BSD FRML MDRD: >60 ML/MIN/1.73 M 2
GLUCOSE SERPL-MCNC: 151 MG/DL (ref 65–99)
HCT VFR BLD AUTO: 39 % (ref 37–47)
HGB BLD-MCNC: 12.1 G/DL (ref 12–16)
IMM GRANULOCYTES # BLD AUTO: 0.03 K/UL (ref 0–0.11)
IMM GRANULOCYTES NFR BLD AUTO: 0.5 % (ref 0–0.9)
LYMPHOCYTES # BLD AUTO: 1.07 K/UL (ref 1–4.8)
LYMPHOCYTES NFR BLD: 19.6 % (ref 22–41)
MCH RBC QN AUTO: 27.9 PG (ref 27–33)
MCHC RBC AUTO-ENTMCNC: 31 G/DL (ref 33.6–35)
MCV RBC AUTO: 90.1 FL (ref 81.4–97.8)
MONOCYTES # BLD AUTO: 0.23 K/UL (ref 0–0.85)
MONOCYTES NFR BLD AUTO: 4.2 % (ref 0–13.4)
NEUTROPHILS # BLD AUTO: 4.13 K/UL (ref 2–7.15)
NEUTROPHILS NFR BLD: 75.5 % (ref 44–72)
NRBC # BLD AUTO: 0 K/UL
NRBC BLD-RTO: 0 /100 WBC
PLATELET # BLD AUTO: 169 K/UL (ref 164–446)
PMV BLD AUTO: 11.8 FL (ref 9–12.9)
POTASSIUM SERPL-SCNC: 4.3 MMOL/L (ref 3.6–5.5)
RBC # BLD AUTO: 4.33 M/UL (ref 4.2–5.4)
SODIUM SERPL-SCNC: 139 MMOL/L (ref 135–145)
WBC # BLD AUTO: 5.5 K/UL (ref 4.8–10.8)

## 2017-12-26 PROCEDURE — 770021 HCHG ROOM/CARE - ISO PRIVATE

## 2017-12-26 PROCEDURE — 36415 COLL VENOUS BLD VENIPUNCTURE: CPT

## 2017-12-26 PROCEDURE — 700111 HCHG RX REV CODE 636 W/ 250 OVERRIDE (IP): Performed by: STUDENT IN AN ORGANIZED HEALTH CARE EDUCATION/TRAINING PROGRAM

## 2017-12-26 PROCEDURE — 80048 BASIC METABOLIC PNL TOTAL CA: CPT

## 2017-12-26 PROCEDURE — 700102 HCHG RX REV CODE 250 W/ 637 OVERRIDE(OP): Performed by: STUDENT IN AN ORGANIZED HEALTH CARE EDUCATION/TRAINING PROGRAM

## 2017-12-26 PROCEDURE — 99232 SBSQ HOSP IP/OBS MODERATE 35: CPT | Mod: GC | Performed by: INTERNAL MEDICINE

## 2017-12-26 PROCEDURE — A9270 NON-COVERED ITEM OR SERVICE: HCPCS | Performed by: STUDENT IN AN ORGANIZED HEALTH CARE EDUCATION/TRAINING PROGRAM

## 2017-12-26 PROCEDURE — 85025 COMPLETE CBC W/AUTO DIFF WBC: CPT

## 2017-12-26 PROCEDURE — 700105 HCHG RX REV CODE 258: Performed by: STUDENT IN AN ORGANIZED HEALTH CARE EDUCATION/TRAINING PROGRAM

## 2017-12-26 RX ORDER — OSELTAMIVIR PHOSPHATE 75 MG/1
75 CAPSULE ORAL EVERY 12 HOURS
Status: DISCONTINUED | OUTPATIENT
Start: 2017-12-26 | End: 2017-12-27

## 2017-12-26 RX ADMIN — OSELTAMIVIR PHOSPHATE 75 MG: 75 CAPSULE ORAL at 20:58

## 2017-12-26 RX ADMIN — OMEPRAZOLE 40 MG: 20 CAPSULE, DELAYED RELEASE ORAL at 09:10

## 2017-12-26 RX ADMIN — SERTRALINE 100 MG: 100 TABLET, FILM COATED ORAL at 09:10

## 2017-12-26 RX ADMIN — SALMETEROL XINAFOATE 1 PUFF: 50 POWDER, METERED ORAL; RESPIRATORY (INHALATION) at 09:10

## 2017-12-26 RX ADMIN — ASPIRIN 81 MG: 81 TABLET, CHEWABLE ORAL at 20:57

## 2017-12-26 RX ADMIN — OXYBUTYNIN CHLORIDE 5 MG: 5 TABLET ORAL at 20:59

## 2017-12-26 RX ADMIN — OSELTAMIVIR PHOSPHATE 75 MG: 75 CAPSULE ORAL at 12:29

## 2017-12-26 RX ADMIN — ENOXAPARIN SODIUM 40 MG: 100 INJECTION SUBCUTANEOUS at 09:10

## 2017-12-26 RX ADMIN — SODIUM CHLORIDE: 9 INJECTION, SOLUTION INTRAVENOUS at 02:12

## 2017-12-26 RX ADMIN — OXYBUTYNIN CHLORIDE 5 MG: 5 TABLET ORAL at 09:10

## 2017-12-26 RX ADMIN — SALMETEROL XINAFOATE 1 PUFF: 50 POWDER, METERED ORAL; RESPIRATORY (INHALATION) at 20:59

## 2017-12-26 RX ADMIN — ISOSORBIDE MONONITRATE 30 MG: 30 TABLET ORAL at 20:58

## 2017-12-26 RX ADMIN — LOVASTATIN 20 MG: 20 TABLET ORAL at 20:58

## 2017-12-26 RX ADMIN — LEVOTHYROXINE SODIUM 100 MCG: 100 TABLET ORAL at 05:57

## 2017-12-26 ASSESSMENT — ENCOUNTER SYMPTOMS
FOCAL WEAKNESS: 0
FALLS: 0
WHEEZING: 0
COUGH: 0
SORE THROAT: 0
MYALGIAS: 0
FEVER: 0
NAUSEA: 0
PHOTOPHOBIA: 0
CHILLS: 0
DOUBLE VISION: 0
VOMITING: 0
ABDOMINAL PAIN: 0
HEADACHES: 0
WEAKNESS: 1
DIZZINESS: 1
DEPRESSION: 0
BLURRED VISION: 0

## 2017-12-26 ASSESSMENT — PATIENT HEALTH QUESTIONNAIRE - PHQ9
2. FEELING DOWN, DEPRESSED, IRRITABLE, OR HOPELESS: NOT AT ALL
SUM OF ALL RESPONSES TO PHQ9 QUESTIONS 1 AND 2: 0
SUM OF ALL RESPONSES TO PHQ QUESTIONS 1-9: 0
1. LITTLE INTEREST OR PLEASURE IN DOING THINGS: NOT AT ALL

## 2017-12-26 ASSESSMENT — PAIN SCALES - GENERAL
PAINLEVEL_OUTOF10: 0

## 2017-12-26 NOTE — PROGRESS NOTES
"Pt is A&O x 4. Medicated per MAR pt has been afebrile so far. On 2L of O2 via NC. pSaO2 95%. Denies pain. Pt states \"I'm feeling better now\". Will continue to monitor the pt.     Patient in bed resting. Safety precautions in place. Bed in lowered and locked position. Call light within reach. Updated on plan of care. Hourly rounding in place. Daughter at the bedside.   "

## 2017-12-26 NOTE — CARE PLAN
Problem: Safety  Goal: Will remain free from injury  Outcome: PROGRESSING AS EXPECTED  Safety precautions in place. Call light within reach. Bed is low and in locked position. Hourly rounding in place.     Problem: Respiratory:  Goal: Respiratory status will improve  Outcome: PROGRESSING AS EXPECTED  Pt on 2L of O2 via NC . pSaO2 95%. Pt has productive cough. Educated on IS. Pt demonstrated understanding. Will continue to assess the pt.

## 2017-12-26 NOTE — DISCHARGE SUMMARY
Internal Medicine Discharge Summary  Note Author: Alivia Lin M.D.       Admit Date:  12/25/2017       Discharge Date:   12/26/2017    Service:   R Internal Medicine Gray Team  Attending Physician(s):   Dr. Hdz       Senior Resident(s):   Dr. Fontanez  Jack Resident(s):   Dr. Lin      Primary Diagnosis:   Acute hypoxia likely secondary to COPD acutely exacerbated by influenza    Secondary Diagnoses:                Principal Problem:    Influenza POA: Yes  Active Problems:    COPD (chronic obstructive pulmonary disease) (CMS-[facility]) POA: Unknown    Essential hypertension POA: Yes    Hypothyroidism POA: Yes    Hypokalemia POA: Unknown    Dehydration POA: Yes    Tobacco use POA: Unknown  Resolved Problems:    * No resolved hospital problems. *      Hospital Summary (Brief Narrative):       Ms. Oconnor is a 67-year-old female with a past medical history of COPD, hypertension, hypothyroidism, dyslipidemia who presented with acute hypoxic respiratory failure due to influenza. Recently complained of a four-day history of generalized fatigue, myalgias, headache, dry cough with poor by mouth intake. She went to the urgent care for these symptoms and was found to be hypoxic to 87% on room air. At [facility] the patient was found to be afebrile, slightly hypotensive within normal WBC count. CXR showed no acute process. Patient was found to be influenza A+ with no signs of bacterial infection.  Patient was treated with supportive care, Tamiflu and her blood pressure responded to IV fluids. Patient's symptoms improved however she remained hypoxic with exertion on room air. Ambulatory oxygen assessment showed desaturation to 78% on room air with ambulation. Patient was subsequently given home oxygen and discharged with Tamiflu in stable condition.      Patient /Hospital Summary (Details -- Problem Oriented) :          * Influenza   Assessment & Plan    - Patient complaining of 4 day history of worsening chest pain,  fatigue, myalgias, dry cough  - Poor PO intake over this period  - 0/4 SIRS, 1/3 qSOFA, normal LA on admit  - CXR shows no acute process  - Influenza A positive  - IVF  - Continue Tamiflu  - Continue to manage patient with supportive care        COPD (chronic obstructive pulmonary disease) (CMS-[facility])   Assessment & Plan    - Patient told she had COPD, doesn't remember doing PFTs  - Smokes 0.5-1 ppd for 50 years  - Recently quit smoking 1 week ago  - RT protocol, continuous pulse ox  - Nicotine replacement        Hypokalemia   Assessment & Plan    - Patient hypokalemic on admit, 3.4  - We'll continue to monitor and replace        Hypothyroidism   Assessment & Plan    - Continue home medication        Essential hypertension   Assessment & Plan    - Patient has a history of hypertension  - Hold home medications          Consultants:     None    Procedures:        None    Imaging/ Testing:      CXR 12/25/2017      Discharge Medications:         Medication Reconciliation: Completed       Medication List      START taking these medications      Instructions   oseltamivir 75 MG Caps  Commonly known as:  TAMIFLU   Take 1 Cap by mouth every 12 hours for 3 days.  Dose:  75 mg        CONTINUE taking these medications      Instructions   amlodipine-valsartan  MG per tablet  Commonly known as:  EXFORGE   Take 1 Tab by mouth every day.  Dose:  1 Tab     aspirin 81 MG Chew chewable tablet  Commonly known as:  ASA   Take 81 mg by mouth every evening.  Dose:  81 mg     AZELASTINE HCL NA   Spray 2 Sprays in nose 2 Times a Day.  Dose:  2 [place]     BEVESPI AEROSPHERE 9-4.8 MCG/ACT Aero  Generic drug:  Glycopyrrolate-Formoterol   Inhale 2 Puffs by mouth 2 Times a Day.  Dose:  2 Puff     CHANTIX 1 MG tablet  Generic drug:  varenicline   Take 1 mg by mouth 2 times a day.  Dose:  1 mg     isosorbide mononitrate SR 30 MG Tb24  Commonly known as:  IMDUR   Take 30 mg by mouth every evening.  Dose:  30 mg     levothyroxine 100 MCG  Tabs  Commonly known as:  SYNTHROID   Take 100 mcg by mouth Every morning on an empty stomach.  Dose:  100 mcg     lovastatin 20 MG Tabs  Commonly known as:  MEVACOR   Take 20 mg by mouth every evening.  Dose:  20 mg     niacin 500 MG Tabs   Take 500 mg by mouth every day. OTC formulation  Dose:  500 mg     omeprazole 40 MG delayed-release capsule  Commonly known as:  PRILOSEC   Take 40 mg by mouth every day.  Dose:  40 mg     oxybutynin 5 MG Tabs  Commonly known as:  DITROPAN   Take 5 mg by mouth 2 Times a Day.  Dose:  5 mg     sertraline 100 MG Tabs  Commonly known as:  ZOLOFT   Take 100 mg by mouth every day.  Dose:  100 mg     Vitamin D3 2000 units Tabs   Take 2,000 Units by mouth 2 Times a Day.  Dose:  2000 Units            Disposition:   Stable    Diet:   Low salt, low fat    Activity:   As tolerated    Instructions:         The patient was instructed to return to the ER in the event of worsening symptoms. I have counseled the patient on the importance of compliance and the patient has agreed to proceed with all medical recommendations and follow up plan indicated above.   The patient understands that all medications come with benefits and risks. Risks may include permanent injury or death and these risks can be minimized with close reassessment and monitoring.          Primary Care Provider:    PCP in [place], advised to follow up within 1-2 weeks      Follow up appointment details :    Please follow with PCP in 1-2 weeks      Pending Studies:      None      Discharge Time (Minutes) :    Greater than 45 minutes spent on discharge day patient visit, preparing discharge paperwork and arranging for patient follow up.    Hospital Course Type:  Inpatient stay greater than >2 midnights      Condition on Discharge    ______________________________________________________________________    Interval history/exam for day of discharge:    Patient no acute distress, denies shortness of breath or chest pain at rest but  does note shortness of breath during exertion when on room air    Vitals:    12/25/17 2259 12/26/17 0417 12/26/17 0750 12/26/17 1152   BP: 109/69 115/63 114/69 104/67   Pulse: 62 61 75 69   Resp: 16 16 18 17   Temp: 36.4 °C (97.6 °F) 36.2 °C (97.1 °F) 36.3 °C (97.4 °F) 36.4 °C (97.5 °F)   SpO2: 95% 98% 95% 92%   Weight:       Height:         Weight/BMI: Body mass index is 26.57 kg/m².  Pulse Oximetry: 92 %, O2 (LPM): 2, O2 Delivery: Nasal Cannula    General:No acute distress  CVS: Regular rate and rhythm, no murmur appreciated  PULM: Clear to auscultation bilaterally  Abdomen: Soft, nontender, nondistended    Most Recent Labs:    Lab Results   Component Value Date/Time    WBC 5.5 12/26/2017 12:10 AM    RBC 4.33 12/26/2017 12:10 AM    HEMOGLOBIN 12.1 12/26/2017 12:10 AM    HEMATOCRIT 39.0 12/26/2017 12:10 AM    MCV 90.1 12/26/2017 12:10 AM    MCH 27.9 12/26/2017 12:10 AM    MCHC 31.0 (L) 12/26/2017 12:10 AM    MPV 11.8 12/26/2017 12:10 AM    NEUTSPOLYS 75.50 (H) 12/26/2017 12:10 AM    LYMPHOCYTES 19.60 (L) 12/26/2017 12:10 AM    MONOCYTES 4.20 12/26/2017 12:10 AM    EOSINOPHILS 0.00 12/26/2017 12:10 AM    BASOPHILS 0.20 12/26/2017 12:10 AM      Lab Results   Component Value Date/Time    SODIUM 139 12/26/2017 12:10 AM    POTASSIUM 4.3 12/26/2017 12:10 AM    CHLORIDE 111 12/26/2017 12:10 AM    CO2 21 12/26/2017 12:10 AM    GLUCOSE 151 (H) 12/26/2017 12:10 AM    BUN 25 (H) 12/26/2017 12:10 AM    CREATININE 0.75 12/26/2017 12:10 AM      Lab Results   Component Value Date/Time    ALTSGPT 21 12/25/2017 11:03 AM    ASTSGOT 27 12/25/2017 11:03 AM    ALKPHOSPHAT 58 12/25/2017 11:03 AM    TBILIRUBIN 0.8 12/25/2017 11:03 AM    ALBUMIN 3.9 12/25/2017 11:03 AM    GLOBULIN 3.4 12/25/2017 11:03 AM     No results found for: PROTHROMBTM, INR

## 2017-12-26 NOTE — SENIOR ADMIT NOTE
Anastasia is a 67-year-old female with a past medical history of COPD (not on home oxygen), hypertension, and hypothyroidism who presents today from the urgent care for cough and shortness of breath. Admitted for influenza A+ with new hypoxia 87% on room air. Received flu shot this year Reported chest pain but cardiac workup has been negative in ED (neg troponin, no ST segment or T wave changes in EKG, and no typical cardiac pain).     Hx of smoking 30+ pack years. Denies EtOH or drug abuse. Visiting from [place].       Physical Exam:     AF, VSS     Gen: elderly woman appearing stated age, conversant, in NAD, on nasal cannula   HEENT: EOMI, no scleral icterus, MM dry   Neck: no JVD, no cervical LAD   Resp: CTAB with no w/r/r   CV: RRR with no m/g/r. Extremities warm and well perfused.   Abd: soft, ND, NTTP, +BS, benign   Neuro: A&O x 4.       No leukocytosis or left shift. Potassium 3.5 (repleted).   Influenza A+   CXR -- no infiltrate   EKG -- NSR with no ST segment or T wave changes       A/P:     1. Influenza A   - no evidence of sepsis   - continue symptomatic management with IV fluids NS at 100 cc/hr   - oseltamivir initiated (although sx onset ~4 days ago)   - droplet precautions   - acetaminophen for fever   - do not suspect bacterial superinfection       2. Hypertension   - hold home anti hypertensives in setting of infection       3. COPD   - now on oxygen in setting of flu   - duo nebs, breathing tx   - out pt f/u for PFTs

## 2017-12-26 NOTE — PROGRESS NOTES
Call[facility] to [facility] Dr Parmer [facility]

## 2017-12-26 NOTE — CARE PLAN
Problem: Communication  Goal: The ability to communicate needs accurately and effectively will improve    Intervention: Educate patient and significant other/support system about the plan of care, procedures, treatments, medications and allow for questions  POC discussed with pt along with medications and unit routine.       Problem: Infection  Goal: Will remain free from infection    Intervention: Implement standard precautions and perform hand washing before and after patient contact  Handwashing in place, along with droplet precautions.

## 2017-12-26 NOTE — FACE TO FACE
Face to Face Note  -  Durable Medical Equipment    Alivia Lin M.D. - NPI: [id number]  I certify that this patient is under my care and that they had a durable medical equipment(DME)face to face encounter by myself that meets the physician DME face-to-face encounter requirements with this patient on:    Date of encounter:   Patient:                    MRN:                       YOB: 2018  Charlene Oconnor  [id number]  1950     The encounter with the patient was in whole, or in part, for the following medical condition, which is the primary reason for durable medical equipment:  COPD    I certify that, based on my findings, the following durable medical equipment is medically necessary:  Oxygen.    HOME O2 Saturation Measurements:(Values must be present for Home Oxygen orders)  Room air sat at rest: 94  Room air sat with amb: 78  With liters of O2: 1, O2 sat at rest with O2: 95  With Liters of O2: 1, O2 sat with amb with O2 : 90  Is the patient mobile?: Yes    My Clinical findings support the need for the above equipment due to:  Hypoxia    Supporting Symptoms: Patient becomes hypoxic and symptomatic with exertion without oxygen

## 2017-12-26 NOTE — DISCHARGE PLANNING
Call placed to Mili with [facility] who states they do accept insurance but is unsure if the [place] office will. Referral has been sent to [facility] to forward to [place] office.

## 2017-12-26 NOTE — PROGRESS NOTES
Pt arrived to unit. Pt oriented to unit routine and call light. Pt on 2L of O2. VSS. Pt has moist non-productive cough. Pt tolerating fluids and food with no nausea. All questions addressed at this time. Will continue to monitor.

## 2017-12-27 VITALS
HEIGHT: 63 IN | DIASTOLIC BLOOD PRESSURE: 83 MMHG | SYSTOLIC BLOOD PRESSURE: 137 MMHG | TEMPERATURE: 98.8 F | RESPIRATION RATE: 18 BRPM | HEART RATE: 72 BPM | BODY MASS INDEX: 26.58 KG/M2 | WEIGHT: 150 LBS | OXYGEN SATURATION: 93 %

## 2017-12-27 LAB
BACTERIA UR CULT: NORMAL
SIGNIFICANT IND 70042: NORMAL
SITE SITE: NORMAL
SOURCE SOURCE: NORMAL

## 2017-12-27 PROCEDURE — 700102 HCHG RX REV CODE 250 W/ 637 OVERRIDE(OP): Performed by: STUDENT IN AN ORGANIZED HEALTH CARE EDUCATION/TRAINING PROGRAM

## 2017-12-27 PROCEDURE — A9270 NON-COVERED ITEM OR SERVICE: HCPCS | Performed by: STUDENT IN AN ORGANIZED HEALTH CARE EDUCATION/TRAINING PROGRAM

## 2017-12-27 PROCEDURE — 99239 HOSP IP/OBS DSCHRG MGMT >30: CPT | Mod: GC | Performed by: INTERNAL MEDICINE

## 2017-12-27 PROCEDURE — 700111 HCHG RX REV CODE 636 W/ 250 OVERRIDE (IP): Performed by: STUDENT IN AN ORGANIZED HEALTH CARE EDUCATION/TRAINING PROGRAM

## 2017-12-27 RX ORDER — BENZONATATE 100 MG/1
100 CAPSULE ORAL 3 TIMES DAILY PRN
Status: DISCONTINUED | OUTPATIENT
Start: 2017-12-27 | End: 2017-12-27 | Stop reason: HOSPADM

## 2017-12-27 RX ORDER — OSELTAMIVIR PHOSPHATE 75 MG/1
75 CAPSULE ORAL EVERY 12 HOURS
Qty: 6 CAP | Refills: 0 | Status: SHIPPED | OUTPATIENT
Start: 2017-12-27 | End: 2017-12-30

## 2017-12-27 RX ORDER — OLANZAPINE 5 MG/1
2.5 TABLET ORAL NIGHTLY PRN
Status: DISCONTINUED | OUTPATIENT
Start: 2017-12-27 | End: 2017-12-27 | Stop reason: HOSPADM

## 2017-12-27 RX ORDER — OSELTAMIVIR PHOSPHATE 75 MG/1
75 CAPSULE ORAL EVERY 12 HOURS
Status: DISCONTINUED | OUTPATIENT
Start: 2017-12-27 | End: 2017-12-27 | Stop reason: HOSPADM

## 2017-12-27 RX ORDER — ONDANSETRON 4 MG/1
4 TABLET, ORALLY DISINTEGRATING ORAL EVERY 8 HOURS PRN
Status: DISCONTINUED | OUTPATIENT
Start: 2017-12-27 | End: 2017-12-27 | Stop reason: HOSPADM

## 2017-12-27 RX ADMIN — ENOXAPARIN SODIUM 40 MG: 100 INJECTION SUBCUTANEOUS at 09:30

## 2017-12-27 RX ADMIN — OSELTAMIVIR PHOSPHATE 75 MG: 75 CAPSULE ORAL at 18:05

## 2017-12-27 RX ADMIN — OSELTAMIVIR PHOSPHATE 75 MG: 75 CAPSULE ORAL at 09:31

## 2017-12-27 RX ADMIN — OXYBUTYNIN CHLORIDE 5 MG: 5 TABLET ORAL at 09:30

## 2017-12-27 RX ADMIN — SERTRALINE 100 MG: 100 TABLET, FILM COATED ORAL at 09:30

## 2017-12-27 RX ADMIN — OMEPRAZOLE 40 MG: 20 CAPSULE, DELAYED RELEASE ORAL at 09:30

## 2017-12-27 RX ADMIN — SALMETEROL XINAFOATE 1 PUFF: 50 POWDER, METERED ORAL; RESPIRATORY (INHALATION) at 09:30

## 2017-12-27 RX ADMIN — LEVOTHYROXINE SODIUM 100 MCG: 100 TABLET ORAL at 06:13

## 2017-12-27 ASSESSMENT — PAIN SCALES - GENERAL
PAINLEVEL_OUTOF10: 0

## 2017-12-27 NOTE — PROGRESS NOTES
Spoke to Sw may be here for a few days, only facility that can send commercial O2 allowed on air flights is from [place] and has to ship here [employer].

## 2017-12-27 NOTE — PROGRESS NOTES
Shift report received from night RN, assumed care at 0715. Patient up in chair with no pain present, routinely medicated prn per MAR. AOx4, up standby assist, calls appropriately, bed alarm not in use. PIV assessed and locked. O2 1.5L nasal cannula, SPO2 96%. VTE Lovenox. Plan is possible d/c today when O2 is delivered for home. Discussed POC for multimodal pain management, monitoring VS/labs/I&O, mobility, day time routine, comfort, and safety. Patient has call light and personal belongings within reach. Safety and fall precautions in place. Reviewed current labs, notes, medications, and orders. Hourly rounding in place with RN rounding on odd hours and CNA on even hours.

## 2017-12-27 NOTE — DISCHARGE INSTRUCTIONS
Discharge Instructions    Discharged to home by car with relative. Discharged via wheelchair, hospital escort: Yes.  Special equipment needed: Not Applicable    Be sure to schedule a follow-up appointment with your primary care doctor or any specialists as instructed.     Discharge Plan:   Diet Plan: Discussed  Activity Level: Discussed  Smoking Cessation Offered: Patient Refused  Confirmed Follow up Appointment: Patient to Call and Schedule Appointment  Confirmed Symptoms Management: Discussed  Medication Reconciliation Updated: Yes  Influenza Vaccine Indication:  (currently has flu, cannot give)    I understand that a diet low in cholesterol, fat, and sodium is recommended for good health. Unless I have been given specific instructions below for another diet, I accept this instruction as my diet prescription.   Other diet: Regular    Special Instructions: None    · Is patient discharged on Warfarin / Coumadin?   No     · Is patient Post Blood Transfusion?  No    Depression / Suicide Risk    As you are discharged from this [facility] facility, it is important to learn how to keep safe from harming yourself.    Recognize the warning signs:  · Abrupt changes in personality, positive or negative- including increase in energy   · Giving away possessions  · Change in eating patterns- significant weight changes-  positive or negative  · Change in sleeping patterns- unable to sleep or sleeping all the time   · Unwillingness or inability to communicate  · Depression  · Unusual sadness, discouragement and loneliness  · Talk of wanting to die  · Neglect of personal appearance   · Rebelliousness- reckless behavior  · Withdrawal from people/activities they love  · Confusion- inability to concentrate     If you or a loved one observes any of these behaviors or has concerns about self-harm, here's what you can do:  · Talk about it- your feelings and reasons for harming yourself  · Remove any means that you might use to hurt  "yourself (examples: pills, rope, extension cords, firearm)  · Get professional help from the community (Mental Health, Substance Abuse, psychological counseling)  · Do not be alone:Call your Safe Contact- someone whom you trust who will be there for you.  · Call your local CRISIS HOTLINE 009-9854 or 465-647-1002  · Call your local Children's Mobile Crisis Response Team [place] (057) 423-2566 or www.[employer]  · Call the toll free National Suicide Prevention Hotlines   · National Suicide Prevention Lifeline 773-651-UYRJ (9092)  · National Hope Line Network 800-SUICIDE (007-6081)    Influenza Facts  Flu (influenza) is a contagious respiratory illness caused by the influenza viruses. It can cause mild to severe illness. While most healthy people recover from the flu without specific treatment and without complications, older people, young children, and people with certain health conditions are at higher risk for serious complications from the flu, including death.  CAUSES   · The flu virus is spread from person to person by respiratory droplets from coughing and sneezing.   · A person can also become infected by touching an object or surface with a virus on it and then touching their mouth, eye or nose.   · Adults may be able to infect others from 1 day before symptoms occur and up to 7 days after getting sick. So it is possible to give someone the flu even before you know you are sick and continue to infect others while you are sick.   SYMPTOMS   · Fever (usually high).   · Headache.   · Tiredness (can be extreme).   · Cough.   · Sore throat.   · Runny or stuffy nose.   · Body aches.   · Diarrhea and vomiting may also occur, particularly in children.   · These symptoms are referred to as \"flu-like symptoms\". A lot of different illnesses, including the common cold, can have similar symptoms.   DIAGNOSIS   · There are tests that can determine if you have the flu as long you are tested within the first 2 or " 3 days of illness.   · A doctor's exam and additional tests may be needed to identify if you have a disease that is a complicating the flu.   RISKS AND COMPLICATIONS   Some of the complications caused by the flu include:  · Bacterial pneumonia or progressive pneumonia caused by the flu virus.   · Loss of body fluids (dehydration).   · Worsening of chronic medical conditions, such as heart failure, asthma, or diabetes.   · Sinus problems and ear infections.   HOME CARE INSTRUCTIONS   · Seek medical care early on.   · If you are at high risk from complications of the flu, consult your health-care provider as soon as you develop flu-like symptoms. Those at high risk for complications include:   · People 65 years or older.   · People with chronic medical conditions, including diabetes.   · Pregnant women.   · Young children.   · Your caregiver may recommend use of an antiviral medication to help treat the flu.   · If you get the flu, get plenty of rest, drink a lot of liquids, and avoid using alcohol and tobacco.   · You can take over-the-counter medications to relieve the symptoms of the flu if your caregiver approves. (Never give aspirin to children or teenagers who have flu-like symptoms, particularly fever).   PREVENTION   The single best way to prevent the flu is to get a flu vaccine each fall. Other measures that can help protect against the flu are:  · Antiviral Medications   · A number of antiviral drugs are approved for use in preventing the flu. These are prescription medications, and a doctor should be consulted before they are used.   · Habits for Good Health   · Cover your nose and mouth with a tissue when you cough or sneeze, throw the tissue away after you use it.   · Wash your hands often with soap and water, especially after you cough or sneeze. If you are not near water, use an alcohol-based hand .   · Avoid people who are sick.   · If you get the flu, stay home from work or school. Avoid  contact with other people so that you do not make them sick, too.   · Try not to touch your eyes, nose, or mouth as germs ore often spread this way.   IN CHILDREN, EMERGENCY WARNING SIGNS THAT NEED URGENT MEDICAL ATTENTION:  · Fast breathing or trouble breathing.   · Bluish skin color.   · Not drinking enough fluids.   · Not waking up or not interacting.   · Being so irritable that the child does not want to be held.   · Flu-like symptoms improve but then return with fever and worse cough.   · Fever with a rash.   IN ADULTS, EMERGENCY WARNING SIGNS THAT NEED URGENT MEDICAL ATTENTION:  · Difficulty breathing or shortness of breath.   · Pain or pressure in the chest or abdomen.   · Sudden dizziness.   · Confusion.   · Severe or persistent vomiting.   SEEK IMMEDIATE MEDICAL CARE IF:   You or someone you know is experiencing any of the symptoms above. When you arrive at the emergency center,report that you think you have the flu. You may be asked to wear a mask and/or sit in a secluded area to protect others from getting sick.  MAKE SURE YOU:   · Understand these instructions.   · Monitor your condition.   · Seek medical care if you are getting worse, or not improving.   Document Released: 12/20/2004 Document Revised: 03/11/2013 Document Reviewed: 09/16/2010  [employer]® Patient Information ©2013 [employer], Triage.

## 2017-12-27 NOTE — DISCHARGE PLANNING
NIGEL met with pt at bedside to discuss 02. SW informed pt that [facility] has a location locally and in [place]. Pt gave verbal auth for [facility].     Pt notified that [facility] may not be contracted with her out of state insurance and 02 will be out of pocket, however [facility] in [place] will be more likely to be contracted.     Pt has flight booked home on January 5th. SW discussed Oxygen 2 go for flight home. SW explained process and notified her that it will be an out of pocket expense.     SW to meet with pt tomorrow to provide Oxygen to go application.

## 2017-12-27 NOTE — PROGRESS NOTES
Internal Medicine Interval Note  Note Author: Alivia Lin M.D.     Name Charlene Oconnor 1950   Age/Sex 67 y.o. female   MRN [id number]   Code Status Full Code     After 5PM or if no immediate response to page, please call for cross-coverage  Attending/Team: Dr. Hdz/Aureliano See Patient List for primary contact information  Call [phone] to page    1st Call - Day Intern (R1):   Dr. Lin 2nd Call - Day Sr. Resident (R2/R3):   Dr. Fontanez     Reason for interval visit  (Principal Problem)   Influenza    Interval Problem Daily Status Update  (24 hours)   - Patient doing well today, states symptoms of myalgias, headaches has subsided, continues to have some postural symptoms  - Patient tolerating PO intake, will hold IVF  - Continue Tamiflu  - Patient failed ambulatory O2 assessment, desaturated to 78% during ambulation, will require home oxygen  - Plan for upcoming discharge       Review of Systems   Constitutional: Negative for chills and fever.   HENT: Negative for congestion, hearing loss and sore throat.    Eyes: Negative for blurred vision, double vision and photophobia.   Respiratory: Negative for cough and wheezing.    Cardiovascular: Negative for chest pain and leg swelling.   Gastrointestinal: Negative for abdominal pain, nausea and vomiting.   Genitourinary: Negative for dysuria and urgency.   Musculoskeletal: Negative for falls and myalgias.   Skin: Negative for rash.   Neurological: Positive for dizziness and weakness. Negative for focal weakness and headaches.   Psychiatric/Behavioral: Negative for depression.       Consultants/Specialty  None    Disposition  Inpatient    Quality Measures    Reviewed items::  EKG reviewed, Labs reviewed, Radiology images reviewed and Medications reviewed  Lawton catheter::  No [place]  : No central line.  DVT prophylaxis pharmacological::  Enoxaparin (Lovenox)  DVT prophylaxis - mechanical:  SCDs  Ulcer Prophylaxis::  No        Physical Exam      Vitals:    12/26/17 0417 12/26/17 0750 12/26/17 1152 12/26/17 1544   BP: 115/63 114/69 104/67 101/66   Pulse: 61 75 69 72   Resp: 16 18 17 16   Temp: 36.2 °C (97.1 °F) 36.3 °C (97.4 °F) 36.4 °C (97.5 °F) 36.6 °C (97.9 °F)   SpO2: 98% 95% 92% 94%   Weight:       Height:         Body mass index is 26.57 kg/m².    Oxygen Therapy:  Pulse Oximetry: 94 %, O2 (LPM): 2, O2 Delivery: Nasal Cannula      Physical Exam   Constitutional: She is oriented to person, place, and time and well-developed, well-nourished, and in no distress.   HENT:   Head: Normocephalic and atraumatic.   Mouth/Throat: No oropharyngeal exudate.   Eyes: Conjunctivae are normal. Pupils are equal, round, and reactive to light. No scleral icterus.   Neck: Normal range of motion. Neck supple. No JVD present. No thyromegaly present.   Cardiovascular: Normal rate, regular rhythm and normal heart sounds.    No murmur heard.  Pulmonary/Chest: Effort normal and breath sounds normal. No respiratory distress. She has no wheezes.   Abdominal: Soft. Bowel sounds are normal. She exhibits no distension. There is no tenderness.   Musculoskeletal: Normal range of motion. She exhibits no edema.   Neurological: She is alert and oriented to person, place, and time.   Skin: Skin is dry. No rash noted. She is not diaphoretic. No erythema.   Psychiatric: Affect normal.       Lab Data Review:   12/26/2017  4:44 PM    Recent Labs      12/25/17   1103  12/26/17   0010   SODIUM  134*  139   POTASSIUM  3.5*  4.3   CHLORIDE  101  111   CO2  23  21   BUN  25*  25*   CREATININE  1.22  0.75   CALCIUM  9.0  8.5       Recent Labs      12/25/17   1103  12/26/17   0010   ALTSGPT  21   --    ASTSGOT  27   --    ALKPHOSPHAT  58   --    TBILIRUBIN  0.8   --    GLUCOSE  114*  151*       Recent Labs      12/25/17   1103  12/26/17   0010   RBC  4.42  4.33   HEMOGLOBIN  12.7  12.1   HEMATOCRIT  39.6  39.0   PLATELETCT  181  169       Recent Labs      12/25/17   1103  12/26/17   0010   WBC   6.6  5.5   NEUTSPOLYS  67.20  75.50*   LYMPHOCYTES  20.50*  19.60*   MONOCYTES  11.00  4.20   EOSINOPHILS  0.30  0.00   BASOPHILS  0.50  0.20   ASTSGOT  27   --    ALTSGPT  21   --    ALKPHOSPHAT  58   --    TBILIRUBIN  0.8   --            Assessment/Plan     * Influenza- (present on admission)   Assessment & Plan    - Patient complaining of 4 day history of worsening chest pain, fatigue, myalgias, dry cough  - Poor PO intake over this period  - 0/4 SIRS, 1/3 qSOFA, normal LA on admit  - CXR shows no acute process  - Influenza A positive  - Continue Tamiflu  - Continue to manage patient with supportive care        COPD (chronic obstructive pulmonary disease) (CMS-HCC)- (present on admission)   Assessment & Plan    - Patient told she had COPD, doesn't remember doing PFTs  - Smokes 0.5-1 ppd for 50 years  - Recently quit smoking 1 week ago  - Patient failed ambulatory O2 assessment, desaturated to 78% during ambulation, will require home oxygen  - RT protocol, continuous pulse ox  - Nicotine replacement        Hypokalemia- (present on admission)   Assessment & Plan    - Patient hypokalemic on admit, 3.4  - We'll continue to monitor and replace        Hypothyroidism- (present on admission)   Assessment & Plan    - Continue home medication        Essential hypertension- (present on admission)   Assessment & Plan    - Patient has a history of hypertension  - Hold home medications

## 2017-12-27 NOTE — PROGRESS NOTES
Patient resting in bed. A/O X4. VS WDL. 1 L nasal cannula. Patient educated on POC and discharge plan. Call light and personal belongings wiithin reach. Fall risk and safety precautions in place per protocol. Hourly rounding in place. Will continue to monitor.

## 2017-12-27 NOTE — DISCHARGE PLANNING
SW received pt's Oxygen 2 Go order. Faxed to rental office.     Pt flying [employer]. RN Case Manager will check with airlines to see if pt needs a fit to fly letter.

## 2017-12-27 NOTE — PROGRESS NOTES
Shift report received from night RN, assumed care at 0715. Patient up in bed with no pain present, routinely medicated prn per MAR. AOx4, up stadby, calls appropriately, bed alarm not in use. PIV assessed and running NS CDI. O2 2L nasal cannula, SPO2 95%. VTE Lovenox. Plan is undecided at this time. Discussed POC for multimodal pain management, monitoring VS/labs/I&O, mobility, day time routine, comfort, and safety. Patient has call light and personal belongings within reach. Safety and fall precautions in place. Reviewed current labs, notes, medications, and orders. Hourly rounding in place with RN rounding on odd hours and CNA on even hours.

## 2017-12-28 ENCOUNTER — PATIENT OUTREACH (OUTPATIENT)
Dept: HEALTH INFORMATION MANAGEMENT | Facility: OTHER | Age: 67
End: 2017-12-28

## 2017-12-28 NOTE — DISCHARGE PLANNING
DME referral sent to [facility] in [place] to arranged O2 when arriving in [place].    #: [phone]  fax: [phone]

## 2017-12-28 NOTE — DISCHARGE PLANNING
Pt received 02 from [facility] in room and at daughter's home. SW will follow up on Oxygen 2 Go while pt is out patient. Oxygen 2 Go is set up to be delivered at pt's daughter's home.     SW will follow on home O2 with [facility] for [place].     MD notified and agreeable to plan. Pt anxious to d/c. MD to meet with pt at bedside. Bedside nurse notified.

## 2017-12-28 NOTE — DISCHARGE PLANNING
SW received call from pt saying that NPI number is wrong on script and pharmacy is not able to fill it.     SW notified ALYCIA CALDERON and requested new script be sent to pt's pharmacy of choice, [facility].     Pt notified plan via VM. SW provided direct line.

## 2017-12-30 LAB
BACTERIA BLD CULT: NORMAL
BACTERIA BLD CULT: NORMAL
SIGNIFICANT IND 70042: NORMAL
SIGNIFICANT IND 70042: NORMAL
SITE SITE: NORMAL
SITE SITE: NORMAL
SOURCE SOURCE: NORMAL
SOURCE SOURCE: NORMAL

## 2020-01-24 NOTE — DISCHARGE PLANNING
Oxygen 2 Go requesting updated order specifying if portable is pulse or Continuous. MD confirmed and changed order to continuous. Faxed new order to [facility]. Portable to be delivered to daughter's home.     [facility] checking with [facility] to ensure they will supply pt with 02 at home.     no